# Patient Record
Sex: MALE | Race: WHITE | Employment: UNEMPLOYED | URBAN - METROPOLITAN AREA
[De-identification: names, ages, dates, MRNs, and addresses within clinical notes are randomized per-mention and may not be internally consistent; named-entity substitution may affect disease eponyms.]

---

## 2018-09-24 ENCOUNTER — APPOINTMENT (OUTPATIENT)
Dept: GENERAL RADIOLOGY | Facility: HOSPITAL | Age: 23
End: 2018-09-24
Payer: COMMERCIAL

## 2018-09-24 ENCOUNTER — HOSPITAL ENCOUNTER (EMERGENCY)
Facility: HOSPITAL | Age: 23
Discharge: HOME | End: 2018-09-24
Payer: COMMERCIAL

## 2018-09-24 VITALS
RESPIRATION RATE: 16 BRPM | DIASTOLIC BLOOD PRESSURE: 67 MMHG | WEIGHT: 195 LBS | HEART RATE: 56 BPM | HEIGHT: 75 IN | BODY MASS INDEX: 24.25 KG/M2 | OXYGEN SATURATION: 98 % | TEMPERATURE: 98 F | SYSTOLIC BLOOD PRESSURE: 115 MMHG

## 2018-09-24 DIAGNOSIS — S63.602A SPRAIN OF LEFT THUMB, UNSPECIFIED SITE OF FINGER, INITIAL ENCOUNTER: Primary | ICD-10-CM

## 2018-09-24 PROCEDURE — 99284 EMERGENCY DEPT VISIT MOD MDM: CPT

## 2018-09-24 PROCEDURE — 29130 APPL FINGER SPLINT STATIC: CPT

## 2018-09-24 PROCEDURE — 73130 X-RAY EXAM OF HAND: CPT

## 2018-09-24 NOTE — ED INITIAL ASSESSMENT (HPI)
Pt reports right thumb injury while playing softball yesterday. Cms intact.  Redness and swelling noted to thumb

## 2018-09-24 NOTE — ED NOTES
Pt presents with redness, bruising and swelling to right thumb. States was catching softball and \"jammed\" ball into thumb. Able to bend and move but with increased pain. Declined ice pack.  + CMS. Denies numbness or tingling.

## 2018-09-24 NOTE — ED PROVIDER NOTES
Patient Seen in: [facility] AND [facility] Emergency Department    History   Patient presents with:  Upper Extremity Injury (musculoskeletal)    Stated Complaint: thumb injury    Patient presents into the emergency room for evaluation of a left thumb injury.   Pa swelling noted. No palpable tenderness to the DIP joint or distal phalanx. Able to flex and extend the digit without limitation. No joint laxity.   No palpable tenderness of digits 2 through 5.  Range of motion is intact to left upper extremity   Skin: H

## (undated) NOTE — ED AVS SNAPSHOT
Radha Lara   MRN: [id number]    Department:  [facility] Emergency Department   Date of Visit:  9/24/2018           Disclosure     Insurance plans vary and the physician(s) referred by the ER may not be covered by your plan.  Please contac CARE PHYSICIAN AT ONCE OR RETURN IMMEDIATELY TO THE EMERGENCY DEPARTMENT. If you have been prescribed any medication(s), please fill your prescription right away and begin taking the medication(s) as directed.   If you believe that any of the medications